# Patient Record
Sex: FEMALE | Race: WHITE | Employment: FULL TIME | URBAN - METROPOLITAN AREA
[De-identification: names, ages, dates, MRNs, and addresses within clinical notes are randomized per-mention and may not be internally consistent; named-entity substitution may affect disease eponyms.]

---

## 2017-07-06 ENCOUNTER — HOSPITAL ENCOUNTER (OUTPATIENT)
Dept: MAMMOGRAPHY | Age: 42
Discharge: HOME | End: 2017-07-06
Attending: FAMILY MEDICINE | Admitting: FAMILY MEDICINE

## 2017-07-06 DIAGNOSIS — Z12.31 SCREENING MAMMOGRAM, ENCOUNTER FOR: ICD-10-CM

## 2018-07-20 ENCOUNTER — HOSPITAL ENCOUNTER (OUTPATIENT)
Dept: MAMMOGRAPHY | Age: 43
Discharge: HOME | End: 2018-07-20
Payer: COMMERCIAL

## 2018-07-20 DIAGNOSIS — Z12.31 SCREENING MAMMOGRAM, ENCOUNTER FOR: ICD-10-CM

## 2018-07-20 PROCEDURE — 77067 SCR MAMMO BI INCL CAD: CPT

## 2019-04-29 ENCOUNTER — HOSPITAL ENCOUNTER (OUTPATIENT)
Age: 44
Setting detail: OUTPATIENT SURGERY
Discharge: HOME | End: 2019-04-29
Attending: INTERNAL MEDICINE | Admitting: INTERNAL MEDICINE
Payer: COMMERCIAL

## 2019-04-29 ENCOUNTER — ANESTHESIA (OUTPATIENT)
Dept: ENDOSCOPY | Age: 44
End: 2019-04-29
Payer: COMMERCIAL

## 2019-04-29 ENCOUNTER — ANESTHESIA EVENT (OUTPATIENT)
Dept: ENDOSCOPY | Age: 44
End: 2019-04-29
Payer: COMMERCIAL

## 2019-04-29 VITALS
BODY MASS INDEX: 41.23 KG/M2 | HEART RATE: 69 BPM | WEIGHT: 210 LBS | TEMPERATURE: 97.4 F | OXYGEN SATURATION: 99 % | SYSTOLIC BLOOD PRESSURE: 116 MMHG | HEIGHT: 60 IN | DIASTOLIC BLOOD PRESSURE: 68 MMHG | RESPIRATION RATE: 14 BRPM

## 2019-04-29 VITALS
SYSTOLIC BLOOD PRESSURE: 116 MMHG | OXYGEN SATURATION: 99 % | RESPIRATION RATE: 13 BRPM | DIASTOLIC BLOOD PRESSURE: 73 MMHG

## 2019-04-29 LAB — PREGNANCY, URINE: NEGATIVE

## 2019-04-29 PROCEDURE — 84703 CHORIONIC GONADOTROPIN ASSAY: CPT

## 2019-04-29 PROCEDURE — 3700000001 HC ADD 15 MINUTES (ANESTHESIA): Performed by: INTERNAL MEDICINE

## 2019-04-29 PROCEDURE — 2500000003 HC RX 250 WO HCPCS: Performed by: NURSE ANESTHETIST, CERTIFIED REGISTERED

## 2019-04-29 PROCEDURE — 2709999900 HC NON-CHARGEABLE SUPPLY: Performed by: INTERNAL MEDICINE

## 2019-04-29 PROCEDURE — 6360000002 HC RX W HCPCS: Performed by: NURSE ANESTHETIST, CERTIFIED REGISTERED

## 2019-04-29 PROCEDURE — 3609027000 HC COLONOSCOPY: Performed by: INTERNAL MEDICINE

## 2019-04-29 PROCEDURE — 2580000003 HC RX 258: Performed by: NURSE ANESTHETIST, CERTIFIED REGISTERED

## 2019-04-29 PROCEDURE — 7100000010 HC PHASE II RECOVERY - FIRST 15 MIN: Performed by: INTERNAL MEDICINE

## 2019-04-29 PROCEDURE — 7100000011 HC PHASE II RECOVERY - ADDTL 15 MIN: Performed by: INTERNAL MEDICINE

## 2019-04-29 PROCEDURE — 3700000000 HC ANESTHESIA ATTENDED CARE: Performed by: INTERNAL MEDICINE

## 2019-04-29 RX ORDER — PROPOFOL 10 MG/ML
INJECTION, EMULSION INTRAVENOUS PRN
Status: DISCONTINUED | OUTPATIENT
Start: 2019-04-29 | End: 2019-04-29 | Stop reason: SDUPTHER

## 2019-04-29 RX ORDER — HYDROCHLOROTHIAZIDE 25 MG/1
25 TABLET ORAL DAILY
COMMUNITY

## 2019-04-29 RX ORDER — LIDOCAINE HYDROCHLORIDE 20 MG/ML
INJECTION, SOLUTION INFILTRATION; PERINEURAL PRN
Status: DISCONTINUED | OUTPATIENT
Start: 2019-04-29 | End: 2019-04-29 | Stop reason: SDUPTHER

## 2019-04-29 RX ORDER — SODIUM CHLORIDE, SODIUM LACTATE, POTASSIUM CHLORIDE, CALCIUM CHLORIDE 600; 310; 30; 20 MG/100ML; MG/100ML; MG/100ML; MG/100ML
INJECTION, SOLUTION INTRAVENOUS CONTINUOUS
Status: DISCONTINUED | OUTPATIENT
Start: 2019-04-29 | End: 2019-04-29 | Stop reason: HOSPADM

## 2019-04-29 RX ORDER — FOLIC ACID/MULTIVIT,IRON,MINER 0.4MG-18MG
TABLET ORAL
COMMUNITY

## 2019-04-29 RX ORDER — BUPROPION HYDROCHLORIDE 150 MG/1
150 TABLET ORAL EVERY MORNING
COMMUNITY

## 2019-04-29 RX ORDER — LISINOPRIL 10 MG/1
10 TABLET ORAL DAILY
COMMUNITY

## 2019-04-29 RX ORDER — SODIUM CHLORIDE, SODIUM LACTATE, POTASSIUM CHLORIDE, CALCIUM CHLORIDE 600; 310; 30; 20 MG/100ML; MG/100ML; MG/100ML; MG/100ML
INJECTION, SOLUTION INTRAVENOUS CONTINUOUS PRN
Status: DISCONTINUED | OUTPATIENT
Start: 2019-04-29 | End: 2019-04-29 | Stop reason: SDUPTHER

## 2019-04-29 RX ADMIN — LIDOCAINE HYDROCHLORIDE 100 MG: 20 INJECTION, SOLUTION INFILTRATION; PERINEURAL at 09:47

## 2019-04-29 RX ADMIN — SODIUM CHLORIDE, POTASSIUM CHLORIDE, SODIUM LACTATE AND CALCIUM CHLORIDE: 600; 310; 30; 20 INJECTION, SOLUTION INTRAVENOUS at 09:47

## 2019-04-29 RX ADMIN — PROPOFOL 200 MG: 10 INJECTION, EMULSION INTRAVENOUS at 09:47

## 2019-04-29 NOTE — ANESTHESIA PRE PROCEDURE
Department of Anesthesiology  Preprocedure Note       Name:  Imani Wharton   Age:  38 y.o.  :  1975                                          MRN:  [id number]         Date:  2019      Surgeon: Kala Rae):  Jacey Roth DO    Procedure: COLON W/ANES. (9:30) (N/A )    Medications prior to admission:   Prior to Admission medications    Medication Sig Start Date End Date Taking? Authorizing Provider   lisinopril (PRINIVIL;ZESTRIL) 10 MG tablet Take 10 mg by mouth daily   Yes Historical Provider, MD   buPROPion (WELLBUTRIN XL) 150 MG extended release tablet Take 150 mg by mouth every morning   Yes Historical Provider, MD [place] 350 MG CAPS Take by mouth   Yes Historical Provider, MD   norethindrone (AYGESTIN) 5 MG tablet Take 0.35 mg by mouth daily   Yes Historical Provider, MD   hydrochlorothiazide (HYDRODIURIL) 25 MG tablet Take 25 mg by mouth daily   Yes Historical Provider, MD       Current medications:    Current Facility-Administered Medications   Medication Dose Route Frequency Provider Last Rate Last Dose    lactated ringers infusion   Intravenous Continuous Sebastian Galloway DO           Allergies:  No Known Allergies    Problem List:  There is no problem list on file for this patient. Past Medical History:        Diagnosis Date    Hyperlipidemia     Hypertension        Past Surgical History:  History reviewed. No pertinent surgical history.     Social History:    Social History     Tobacco Use    Smoking status: Former Smoker    Smokeless tobacco: Former User   Substance Use Topics    Alcohol use: Yes     Comment: social                                Counseling given: Not Answered      Vital Signs (Current):   Vitals:    19 0839   BP: 129/77   Pulse: 78   Resp: 16   Temp: 97.4 °F (36.3 °C)   TempSrc: Temporal   SpO2: 99%   Weight: 210 lb (95.3 kg)   Height: 5' (1.524 m)                                              BP Readings from Last 3 Encounters:   19 129/77 NPO Status: Time of last liquid consumption: 0600                        Time of last solid consumption: 1800                        Date of last liquid consumption: 04/29/19                        Date of last solid food consumption: 04/27/19    BMI:   Wt Readings from Last 3 Encounters:   04/29/19 210 lb (95.3 kg)     Body mass index is 41.01 kg/m². CBC:   Lab Results   Component Value Date    WBC 7.1 12/26/2012    RBC 5.09 12/26/2012    HGB 13.5 12/26/2012    HCT 40.5 12/26/2012    MCV 79.6 12/26/2012    RDW 13.5 12/26/2012     12/26/2012       CMP:   Lab Results   Component Value Date     12/26/2012    K 3.5 12/26/2012     12/26/2012    CO2 25 12/26/2012    BUN 13 12/26/2012    CREATININE 0.9 12/26/2012    GFRAA >60 12/26/2012    AGRATIO 1.5 12/26/2012    GLUCOSE 90 12/26/2012    PROT 7.2 12/26/2012    CALCIUM 9.4 12/26/2012    BILITOT 0.18 12/26/2012    ALKPHOS 81 12/26/2012    AST 17 12/26/2012    ALT 14 12/26/2012       POC Tests: No results for input(s): POCGLU, POCNA, POCK, POCCL, POCBUN, POCHEMO, POCHCT in the last 72 hours.     Coags: No results found for: PROTIME, INR, APTT    HCG (If Applicable):   Lab Results   Component Value Date    PREGTESTUR Negative 04/29/2019        ABGs: No results found for: PHART, PO2ART, UYH2JMD, XNS7VAD, BEART, V9WGSUEZ     Type & Screen (If Applicable):  No results found for: [facility]    Anesthesia Evaluation  Patient summary reviewed and Nursing notes reviewed no history of anesthetic complications:   Airway: Mallampati: III     Neck ROM: full   Dental:          Pulmonary:                              Cardiovascular:    (+) hypertension:,                   Neuro/Psych:               GI/Hepatic/Renal:   (+) morbid obesity          Endo/Other:                     Abdominal:           Vascular:                                        Anesthesia Plan      MAC     ASA 3     (Medications & allergies reviewed  All available lab & EKG data reviewed)  Induction: intravenous. Anesthetic plan and risks discussed with patient. Plan discussed with CRNA.                   Earline Bellamy MD   4/29/2019

## 2019-04-29 NOTE — PROGRESS NOTES
Recovery completed,discharge instructions given to pt and spouse,vebalize understanding. Pt discharged home stable condition.

## 2019-04-29 NOTE — H&P
Gastroenterology Preop Assessment    Patient:   James Courser   :    1975   Facility:   [facility]  Referring/PCP: Jamey Medina MD  Date:     2019    Subjective:   Procedure: Colonoscopy    HPI/Reason for procedure: Patient with constipation    No past medical history on file. No past surgical history on file. Social:   Social History     Tobacco Use    Smoking status: Not on file   Substance Use Topics    Alcohol use: Not on file     Family: No family history on file. Scheduled Medications:     Current Medications:    Prior to Admission medications    Not on File       No current facility-administered medications for this encounter. Infusions:   PRN Medications: Allergies: Allergies not on file      Objective:     Physical Exam:   There were no vitals taken for this visit. HEENT: NCAT  Lungs: CTAB  CV: RRR  Abd: soft, ntd  Ext: dpi    Lab and Imaging Review   Labs:  CBC: No results for input(s): WBC, HGB, HCT, MCV, PLT in the last 72 hours. BMP: No results for input(s): NA, K, CL, CO2, PHOS, BUN, CREATININE in the last 72 hours. Invalid input(s): CA  LIVER PROFILE: No results for input(s): AST, ALT, LIPASE, PROT, BILIDIR, BILITOT, ALKPHOS in the last 72 hours. Invalid input(s): AMYLASE,  ALB  PT/INR: No results for input(s): INR in the last 72 hours. Invalid input(s): PT    Pre-Procedure Assessment / Plan:  ASA: Class 3 - A patient with severe systemic disease that limits activity but is not incapacitating  Airway: Mallampati: II (soft palate, uvula, fauces visible)  Level of Sedation Plan:Deep sedation  Post Procedure plan: Return to same level of care      Plan:   1. Colonoscopy    I assessed the patient and find that the patient is in satisfactory condition to proceed with the planned procedure and sedation plan. I have explained the risk, benefits, and alternatives to the procedure; the patient understands and agrees to proceed.        [facility]

## 2019-04-29 NOTE — ANESTHESIA POSTPROCEDURE EVALUATION
Department of Anesthesiology  Postprocedure Note    Patient: Mandy Meadows  MRN: [id number]  YOB: 1975  Date of evaluation: 4/29/2019  Time:  2:11 PM     Procedure Summary     Date:  04/29/19 Room / Location:  [facility] 01 / [facility]    Anesthesia Start:  0945 Anesthesia Stop:  1001    Procedure:  COLON W/ANES. (9:30) (N/A ) Diagnosis:  (BRBPR)    Surgeon:  Frederick Paz DO Responsible Provider:  Alvina Solano MD    Anesthesia Type:  MAC ASA Status:  3          Anesthesia Type: MAC    Clarissa Phase I: Clarissa Score: 10    Clarissa Phase II: Clarissa Score: 10    Last vitals: Reviewed and per EMR flowsheets.        Anesthesia Post Evaluation    Comments: Postoperative Anesthesia Note    Name:    Mandy Meadows  MRN:      [id number]    Patient Vitals in the past 12 hrs:  04/29/19 1030, BP:116/68, Pulse:69, Resp:14, SpO2:99 %  04/29/19 1015, BP:116/71, Pulse:71, Resp:14, SpO2:99 %  04/29/19 1010, BP:(!) 117/58, Pulse:75, Resp:14, SpO2:100 %  04/29/19 1005, BP:114/68, Pulse:79, Resp:14, SpO2:99 %  04/29/19 1000, BP:110/70, Temp:97.4 °F (36.3 °C), Temp src:Temporal, Pulse:90, Resp:14, SpO2:100 %  04/29/19 0839, BP:129/77, Temp:97.4 °F (36.3 °C), Temp src:Temporal, Pulse:78, Resp:16, SpO2:99 %, Height:5' (1.524 m), Weight:210 lb (95.3 kg)     LABS:    CBC  Lab Results       Component                Value               Date/Time                  WBC                      7.1                 12/26/2012 05:29 PM        HGB                      13.5                12/26/2012 05:29 PM        HCT                      40.5                12/26/2012 05:29 PM        PLT                      281                 12/26/2012 05:29 PM   RENAL  Lab Results       Component                Value               Date/Time                  NA                       139                 12/26/2012 05:29 PM        K                        3.5                 12/26/2012 05:29 PM        CL                       105 12/26/2012 05:29 PM        CO2                      25                  12/26/2012 05:29 PM        BUN                      13                  12/26/2012 05:29 PM        CREATININE               0.9                 12/26/2012 05:29 PM        GLUCOSE                  90                  12/26/2012 05:29 PM   COAGS  No results found for: PROTIME, INR, APTT    Intake & Output:  No intake/output data recorded. Nausea & Vomiting:  No    Level of Consciousness:  Awake    Pain Assessment:  Adequate analgesia    Anesthesia Complications:  No apparent anesthetic complications    SUMMARY      Vital signs stable  OK to discharge from Stage I post anesthesia care.   Care transferred from Anesthesiology department on discharge from perioperative area

## 2019-04-29 NOTE — H&P
Gastroenterology Preop Assessment    Patient:   Giovanna Robles   :    1975   Facility:   [facility]  Referring/PCP: Raudel Mack MD  Date:     2019    Subjective:   Procedure: Colonoscopy    HPI/Reason for procedure:  Bright red blood per rectum    Past Medical History:   Diagnosis Date    Hyperlipidemia     Hypertension      History reviewed. No pertinent surgical history. Social:   Social History     Tobacco Use    Smoking status: Former Smoker    Smokeless tobacco: Former User   Substance Use Topics    Alcohol use: Yes     Comment: social     Family:   Family History   Problem Relation Age of Onset    Other Mother     Cancer Father        Scheduled Medications:     Current Medications:    Prior to Admission medications    Medication Sig Start Date End Date Taking? Authorizing Provider   lisinopril (PRINIVIL;ZESTRIL) 10 MG tablet Take 10 mg by mouth daily   Yes Historical Provider, MD   buPROPion (WELLBUTRIN XL) 150 MG extended release tablet Take 150 mg by mouth every morning   Yes Historical Provider, MD [place] 350 MG CAPS Take by mouth   Yes Historical Provider, MD   norethindrone (AYGESTIN) 5 MG tablet Take 0.35 mg by mouth daily   Yes Historical Provider, MD   hydrochlorothiazide (HYDRODIURIL) 25 MG tablet Take 25 mg by mouth daily   Yes Historical Provider, MD         Current Facility-Administered Medications:     lactated ringers infusion, , Intravenous, Continuous, Maribeth Galloway DO      Infusions:    lactated ringers       PRN Medications: Allergies: No Known Allergies      Objective:     Physical Exam:   /77   Pulse 78   Temp 97.4 °F (36.3 °C) (Temporal)   Resp 16   Ht 5' (1.524 m)   Wt 210 lb (95.3 kg)   LMP 2019   SpO2 99%   BMI 41.01 kg/m²     HEENT: NCAT  Lungs: CTAB  CV: RRR  Abd: soft, ntd  Ext: dpi    Lab and Imaging Review   Labs:  CBC: No results for input(s): WBC, HGB, HCT, MCV, PLT in the last 72 hours.   BMP: No results for input(s): NA, K, CL, CO2, PHOS, BUN, CREATININE in the last 72 hours. Invalid input(s): CA  LIVER PROFILE: No results for input(s): AST, ALT, LIPASE, PROT, BILIDIR, BILITOT, ALKPHOS in the last 72 hours. Invalid input(s): AMYLASE,  ALB  PT/INR: No results for input(s): INR in the last 72 hours. Invalid input(s): PT    Pre-Procedure Assessment / Plan:  ASA: Class 3 - A patient with severe systemic disease that limits activity but is not incapacitating  Airway: Mallampati: II (soft palate, uvula, fauces visible)  Level of Sedation Plan:Deep sedation  Post Procedure plan: Return to same level of care      Plan:   1. Bright red blood per rectum    I assessed the patient and find that the patient is in satisfactory condition to proceed with the planned procedure and sedation plan. I have explained the risk, benefits, and alternatives to the procedure; the patient understands and agrees to proceed.        Dina Ortega  4/29/2019

## 2019-11-08 ENCOUNTER — HOSPITAL ENCOUNTER (OUTPATIENT)
Dept: MAMMOGRAPHY | Age: 44
Discharge: HOME | End: 2019-11-08
Payer: COMMERCIAL

## 2019-11-08 DIAGNOSIS — Z12.31 BREAST CANCER SCREENING BY MAMMOGRAM: ICD-10-CM

## 2019-11-08 PROCEDURE — 77063 BREAST TOMOSYNTHESIS BI: CPT

## 2021-01-25 ENCOUNTER — HOSPITAL ENCOUNTER (OUTPATIENT)
Dept: MAMMOGRAPHY | Age: 46
Discharge: HOME | End: 2021-01-25
Payer: COMMERCIAL

## 2021-01-25 DIAGNOSIS — Z12.39 SCREENING BREAST EXAMINATION: ICD-10-CM

## 2021-01-25 PROCEDURE — 77063 BREAST TOMOSYNTHESIS BI: CPT

## 2021-01-26 ENCOUNTER — TELEPHONE (OUTPATIENT)
Dept: MAMMOGRAPHY | Age: 46
End: 2021-01-26

## 2022-02-23 ENCOUNTER — HOSPITAL ENCOUNTER (OUTPATIENT)
Dept: MAMMOGRAPHY | Age: 47
Discharge: HOME | End: 2022-02-23
Payer: COMMERCIAL

## 2022-02-23 DIAGNOSIS — Z12.31 SCREENING MAMMOGRAM, ENCOUNTER FOR: ICD-10-CM

## 2022-02-23 PROCEDURE — 77063 BREAST TOMOSYNTHESIS BI: CPT

## 2022-02-24 ENCOUNTER — TELEPHONE (OUTPATIENT)
Dept: MAMMOGRAPHY | Age: 47
End: 2022-02-24

## 2025-05-15 ENCOUNTER — HOSPITAL ENCOUNTER (OUTPATIENT)
Dept: MAMMOGRAPHY | Age: 50
Discharge: HOME | End: 2025-05-15
Payer: COMMERCIAL

## 2025-05-15 VITALS — WEIGHT: 170 LBS | BODY MASS INDEX: 33.38 KG/M2 | HEIGHT: 60 IN

## 2025-05-15 DIAGNOSIS — Z12.31 BREAST CANCER SCREENING BY MAMMOGRAM: ICD-10-CM

## 2025-05-15 PROCEDURE — 77067 SCR MAMMO BI INCL CAD: CPT

## (undated) DEVICE — ENDO CARRY-ON PROCEDURE KIT INCLUDES SUCTION TUBING, LUBRICANT, GAUZE, BIOHAZARD STICKER, TRANSPORT PAD AND INTERCEPT BEDSIDE KIT.: Brand: ENDO CARRY-ON PROCEDURE KIT

## (undated) DEVICE — ELECTRODE ECG MONITR FOAM TEAR DROP ADLT RED